# Patient Record
Sex: MALE | Race: WHITE | Employment: PART TIME | URBAN - METROPOLITAN AREA
[De-identification: names, ages, dates, MRNs, and addresses within clinical notes are randomized per-mention and may not be internally consistent; named-entity substitution may affect disease eponyms.]

---

## 2017-09-25 ENCOUNTER — HOSPITAL ENCOUNTER (OUTPATIENT)
Dept: CT IMAGING | Facility: CLINIC | Age: 62
Discharge: HOME | End: 2017-09-25
Attending: INTERNAL MEDICINE | Admitting: INTERNAL MEDICINE
Payer: COMMERCIAL

## 2017-09-25 DIAGNOSIS — K52.9 CHRONIC COLITIS: ICD-10-CM

## 2017-09-25 PROCEDURE — 74174 CTA ABD&PLVS W/CONTRAST: CPT

## 2017-09-25 PROCEDURE — 25000128 H RX IP 250 OP 636: Performed by: INTERNAL MEDICINE

## 2017-09-25 RX ORDER — IOPAMIDOL 755 MG/ML
500 INJECTION, SOLUTION INTRAVASCULAR ONCE
Status: COMPLETED | OUTPATIENT
Start: 2017-09-25 | End: 2017-09-25

## 2017-09-25 RX ADMIN — SODIUM CHLORIDE 80 ML: 9 INJECTION, SOLUTION INTRAVENOUS at 14:36

## 2017-09-25 RX ADMIN — IOPAMIDOL 125 ML: 755 INJECTION, SOLUTION INTRAVENOUS at 14:36

## 2021-04-21 ENCOUNTER — HOSPITAL ENCOUNTER (EMERGENCY)
Facility: CLINIC | Age: 66
Discharge: HOME | End: 2021-04-22
Attending: EMERGENCY MEDICINE | Admitting: EMERGENCY MEDICINE
Payer: MEDICARE

## 2021-04-21 DIAGNOSIS — D72.829 LEUKOCYTOSIS, UNSPECIFIED TYPE: ICD-10-CM

## 2021-04-21 DIAGNOSIS — K52.9 NON-SPECIFIC COLITIS: ICD-10-CM

## 2021-04-21 LAB
ANION GAP SERPL CALCULATED.3IONS-SCNC: 7 MMOL/L (ref 3–14)
BASOPHILS # BLD AUTO: 0.1 10E9/L (ref 0–0.2)
BASOPHILS NFR BLD AUTO: 0.4 %
BUN SERPL-MCNC: 22 MG/DL (ref 7–30)
CALCIUM SERPL-MCNC: 8.7 MG/DL (ref 8.5–10.1)
CHLORIDE SERPL-SCNC: 101 MMOL/L (ref 94–109)
CO2 SERPL-SCNC: 27 MMOL/L (ref 20–32)
CREAT SERPL-MCNC: 1.22 MG/DL (ref 0.66–1.25)
DIFFERENTIAL METHOD BLD: ABNORMAL
EOSINOPHIL # BLD AUTO: 0.1 10E9/L (ref 0–0.7)
EOSINOPHIL NFR BLD AUTO: 0.4 %
ERYTHROCYTE [DISTWIDTH] IN BLOOD BY AUTOMATED COUNT: 13.6 % (ref 10–15)
GFR SERPL CREATININE-BSD FRML MDRD: 60 ML/MIN/{1.73_M2}
GLUCOSE SERPL-MCNC: 121 MG/DL (ref 70–99)
HCT VFR BLD AUTO: 41.6 % (ref 40–53)
HGB BLD-MCNC: 14.1 G/DL (ref 13.3–17.7)
IMM GRANULOCYTES # BLD: 0.1 10E9/L (ref 0–0.4)
IMM GRANULOCYTES NFR BLD: 0.5 %
LACTATE BLD-SCNC: 1.7 MMOL/L (ref 0.7–2)
LYMPHOCYTES # BLD AUTO: 2.4 10E9/L (ref 0.8–5.3)
LYMPHOCYTES NFR BLD AUTO: 12.1 %
MCH RBC QN AUTO: 29.9 PG (ref 26.5–33)
MCHC RBC AUTO-ENTMCNC: 33.9 G/DL (ref 31.5–36.5)
MCV RBC AUTO: 88 FL (ref 78–100)
MONOCYTES # BLD AUTO: 1.2 10E9/L (ref 0–1.3)
MONOCYTES NFR BLD AUTO: 6.2 %
NEUTROPHILS # BLD AUTO: 15.7 10E9/L (ref 1.6–8.3)
NEUTROPHILS NFR BLD AUTO: 80.4 %
NRBC # BLD AUTO: 0 10*3/UL
NRBC BLD AUTO-RTO: 0 /100
PLATELET # BLD AUTO: 244 10E9/L (ref 150–450)
POTASSIUM SERPL-SCNC: 3.1 MMOL/L (ref 3.4–5.3)
RBC # BLD AUTO: 4.72 10E12/L (ref 4.4–5.9)
SODIUM SERPL-SCNC: 135 MMOL/L (ref 133–144)
WBC # BLD AUTO: 19.5 10E9/L (ref 4–11)

## 2021-04-21 PROCEDURE — 80048 BASIC METABOLIC PNL TOTAL CA: CPT | Performed by: EMERGENCY MEDICINE

## 2021-04-21 PROCEDURE — 96361 HYDRATE IV INFUSION ADD-ON: CPT

## 2021-04-21 PROCEDURE — 85025 COMPLETE CBC W/AUTO DIFF WBC: CPT | Performed by: EMERGENCY MEDICINE

## 2021-04-21 PROCEDURE — 99285 EMERGENCY DEPT VISIT HI MDM: CPT | Mod: 25

## 2021-04-21 PROCEDURE — 258N000003 HC RX IP 258 OP 636: Performed by: EMERGENCY MEDICINE

## 2021-04-21 PROCEDURE — 250N000011 HC RX IP 250 OP 636: Performed by: EMERGENCY MEDICINE

## 2021-04-21 PROCEDURE — 83605 ASSAY OF LACTIC ACID: CPT | Performed by: EMERGENCY MEDICINE

## 2021-04-21 PROCEDURE — 96374 THER/PROPH/DIAG INJ IV PUSH: CPT

## 2021-04-21 RX ORDER — TRAZODONE HYDROCHLORIDE 100 MG/1
100 TABLET ORAL AT BEDTIME
COMMUNITY

## 2021-04-21 RX ORDER — HYDROMORPHONE HCL IN WATER/PF 6 MG/30 ML
0.2 PATIENT CONTROLLED ANALGESIA SYRINGE INTRAVENOUS
Status: COMPLETED | OUTPATIENT
Start: 2021-04-21 | End: 2021-04-21

## 2021-04-21 RX ORDER — SODIUM CHLORIDE 9 MG/ML
1000 INJECTION, SOLUTION INTRAVENOUS CONTINUOUS
Status: DISCONTINUED | OUTPATIENT
Start: 2021-04-21 | End: 2021-04-22 | Stop reason: HOSPADM

## 2021-04-21 RX ADMIN — HYDROMORPHONE HYDROCHLORIDE 0.2 MG: 0.2 INJECTION, SOLUTION INTRAMUSCULAR; INTRAVENOUS; SUBCUTANEOUS at 23:37

## 2021-04-21 RX ADMIN — SODIUM CHLORIDE 1000 ML: 9 INJECTION, SOLUTION INTRAVENOUS at 23:36

## 2021-04-21 SDOH — HEALTH STABILITY: MENTAL HEALTH: HOW OFTEN DO YOU HAVE A DRINK CONTAINING ALCOHOL?: NEVER

## 2021-04-21 ASSESSMENT — ENCOUNTER SYMPTOMS
HEMATURIA: 0
DYSURIA: 0
BLOOD IN STOOL: 1
SORE THROAT: 0
COUGH: 0
FREQUENCY: 0
FEVER: 0
DIFFICULTY URINATING: 0
ABDOMINAL PAIN: 1
DIARRHEA: 1
SHORTNESS OF BREATH: 0

## 2021-04-21 ASSESSMENT — MIFFLIN-ST. JEOR: SCORE: 1867.15

## 2021-04-22 VITALS
RESPIRATION RATE: 22 BRPM | SYSTOLIC BLOOD PRESSURE: 111 MMHG | BODY MASS INDEX: 30.48 KG/M2 | HEART RATE: 75 BPM | OXYGEN SATURATION: 97 % | DIASTOLIC BLOOD PRESSURE: 84 MMHG | HEIGHT: 73 IN | TEMPERATURE: 97.4 F | WEIGHT: 230 LBS

## 2021-04-22 PROCEDURE — 250N000013 HC RX MED GY IP 250 OP 250 PS 637: Performed by: EMERGENCY MEDICINE

## 2021-04-22 PROCEDURE — 258N000003 HC RX IP 258 OP 636: Performed by: EMERGENCY MEDICINE

## 2021-04-22 PROCEDURE — 96361 HYDRATE IV INFUSION ADD-ON: CPT

## 2021-04-22 RX ORDER — POTASSIUM CHLORIDE 1500 MG/1
40 TABLET, EXTENDED RELEASE ORAL ONCE
Status: COMPLETED | OUTPATIENT
Start: 2021-04-22 | End: 2021-04-22

## 2021-04-22 RX ORDER — SODIUM CHLORIDE 9 MG/ML
INJECTION, SOLUTION INTRAVENOUS ONCE
Status: COMPLETED | OUTPATIENT
Start: 2021-04-22 | End: 2021-04-22

## 2021-04-22 RX ADMIN — POTASSIUM CHLORIDE 40 MEQ: 1500 TABLET, EXTENDED RELEASE ORAL at 01:28

## 2021-04-22 RX ADMIN — SODIUM CHLORIDE: 900 INJECTION INTRAVENOUS at 01:24

## 2021-04-22 NOTE — ED PROVIDER NOTES
History   Chief Complaint:  Abdominal Pain     HPI   Hu Painting is a 66 year old male with history of diverticulitis and hypertension who presents with abdominal pain. Patient states he had onset bloody diarrhea and abdominal pain two days ago. The bleeding and diarrhea have improved and resolved last night. The abdominal pain persists and is constant. Wife states she wanted him to come in two days ago and is concerned for a flare up of his diverticulitis. They felt for a primary care check up today in [place] and their physician advised them to come into the emergency department as he had an elevated white count. Imaging was taken as seen below. Patient feels fatigued as he took a sleeping pill prior to arrival. Denies current fever, vomiting, and is not on antibiotics. Abdominal pain feels improved when laying down and worse when sitting. He has not taken any pain medication today. He has been drinking fluids but has not eating too much. Denies urinary symptoms and has been voiding. Denies cough, shortness of breath, sore throat, or COVID exposures. He received his second dose of the COVID vaccine approximately two weeks ago. Denies a history of COVID infection.     CT ABDOMEN PELVIS W: 4/21/2021 7:02 PM  1.  Long segment of wall thickening and adjacent inflammation in the left colon, from the distal transverse colon to the sigmoid colon. The differential includes an infectious or inflammatory colitis or bowel ischemia.  2.  Background colonic diverticulosis. The area of involvement is greater than typical for diverticulitis.    Review of Systems   Constitutional: Negative for fever.   HENT: Negative for sore throat.    Respiratory: Negative for cough and shortness of breath.    Gastrointestinal: Positive for abdominal pain, blood in stool (resolved) and diarrhea (resolved).   Genitourinary: Negative for difficulty urinating, dysuria, frequency, hematuria and urgency.   All other systems reviewed  "and are negative.    Allergies:  No known drug allergies    Medications:  Amlodipine   Fluoxetine   Levaquin   Spironolactone     Past Medical History:    Diverticulitis   Hypertension     Social History:  Arrives with wife.     Physical Exam     Patient Vitals for the past 24 hrs:   BP Temp Temp src Pulse Resp SpO2 Height Weight   04/22/21 0330 111/84 -- -- 75 -- 97 % -- --   04/22/21 0315 115/77 -- -- 68 -- 97 % -- --   04/22/21 0300 114/78 -- -- 76 -- 97 % -- --   04/22/21 0245 105/73 -- -- 79 -- 94 % -- --   04/22/21 0230 123/80 -- -- 83 -- 95 % -- --   04/22/21 0215 119/65 -- -- 78 -- 96 % -- --   04/22/21 0200 106/68 -- -- 84 -- 96 % -- --   04/22/21 0145 114/77 -- -- 72 -- 96 % -- --   04/22/21 0130 115/78 -- -- 75 -- 98 % -- --   04/22/21 0115 -- -- -- -- -- 97 % -- --   04/22/21 0100 101/80 -- -- 79 -- 93 % -- --   04/22/21 0045 104/73 -- -- 72 -- 93 % -- --   04/22/21 0030 100/77 -- -- 75 -- 98 % -- --   04/22/21 0015 101/61 -- -- 73 -- 97 % -- --   04/22/21 0000 97/69 -- -- 74 -- 97 % -- --   04/21/21 2345 91/60 -- -- 76 -- 97 % -- --   04/21/21 2330 97/71 -- -- -- -- -- -- --   04/21/21 2132 105/73 97.4  F (36.3  C) Oral 96 22 98 % 1.854 m (6' 1\") 104.3 kg (230 lb)       Physical Exam    HENT:    Mouth/Throat:  Oral mucosa moist.    Eyes: Conjunctivae are normal. No scleral icterus.  Neck: Neck supple. No cervical adenopathy  Cardiovascular: Normal rate, regular rhythm and intact distal pulses.    Pulmonary/Chest: Effort normal and breath sounds normal.   Abdominal: Soft.  No distension. There is no tenderness.   Musculoskeletal:  No edema, No calf tenderness  Neurological:Alert and oriented. Coordination normal.   Skin: Skin is warm and dry.   Psychiatric: Normal mood and affect.     Emergency Department Course     Laboratory:  CBC: WBC 19.5 (H), HGB 14.1,   BMP: Glucose 121 (H), Potassium 3.1 (L), GFR 60 (L), o/w WNL (Creatinine: 1.22)    Lactic Acid (Collected at 2143): 1.7    Emergency " Department Course:    Reviewed:  I reviewed nursing notes, past medical history and care everywhere    Assessments:  2304 I obtained history and examined the patient as noted above.   0116 I rechecked the patient and explained findings.Discussed findings and lack of bed in [facility]. Plan for rehydration and observation in the ED.   0339 Tolerating PO. Feeling well. NO abdominal pain. Discussed repeating labs however patient prefers discharge home.     Interventions:  2336 0.9% NaCl bolus 1,000 mL IV  2337 Dilaudid 0.2 mg IV   0128 Klor-con 40 mEq PO    Disposition:  The patient was discharged to home.     Impression & Plan     Medical Decision Making:  Hu Painting is a 66 year old male sent from an outside clinic with abdominal pain, elevated white blood cell count, and a CT scan which revealed colitis.  ED evaluation is as noted above.  There was some improvement in the white blood cell count from earlier today although it does remain elevated.  His lactic acid is normal.  He has a benign abdominal exam.  His blood pressure was borderline during some time in the emergency department however improved with IV fluids and has had no further declines.  Patient was sent to the emergency department for admission however there are no beds in the hospital and in general in the [facility] area.  For this reason we did perform a prolonged observation in the emergency department.  He has remained stable and asymptomatic.  He has had 2 previous episodes of nonspecific colitis which resolved.  Was treated with antibiotics at least 1 of those times however with resolution of the diarrhea I do not feel that he would benefit from antibiotics at this time feel the potential risk of developing C. difficile is higher.  We discussed repeating labs for reassurance however the patient prefers discharge home at this time which I think is reasonable.  I recommended close follow-up in clinic in 1 day for repeat assessment  and  discussion about follow-up colonoscopy.  He understands to return to the emergency department with any new or worsening symptoms.    Diagnosis:    ICD-10-CM    1. Non-specific colitis  K52.9    2. Leukocytosis, unspecified type  D72.829        Scribe Disclosure:  I, Jann Ortiz, am serving as a scribe at 11:04 PM on 4/21/2021 to document services personally performed by Saida Person based on my observations and the provider's statements to me.            Saida Person MD  04/22/21 0358

## 2021-04-22 NOTE — ED TRIAGE NOTES
Here for lower abdominal pain associated with n/v/d, with blood in stool. Went to clinic, had CT and blood test done that shows elevated WBC and concern for bowel perforation, advised to come to ED. ABCs intact.

## 2021-07-03 ENCOUNTER — HEALTH MAINTENANCE LETTER (OUTPATIENT)
Age: 66
End: 2021-07-03

## 2021-10-23 ENCOUNTER — HEALTH MAINTENANCE LETTER (OUTPATIENT)
Age: 66
End: 2021-10-23

## 2022-07-30 ENCOUNTER — HEALTH MAINTENANCE LETTER (OUTPATIENT)
Age: 67
End: 2022-07-30

## 2022-10-09 ENCOUNTER — HEALTH MAINTENANCE LETTER (OUTPATIENT)
Age: 67
End: 2022-10-09

## 2023-08-19 ENCOUNTER — HEALTH MAINTENANCE LETTER (OUTPATIENT)
Age: 68
End: 2023-08-19